# Patient Record
Sex: FEMALE | Race: WHITE | NOT HISPANIC OR LATINO | URBAN - METROPOLITAN AREA
[De-identification: names, ages, dates, MRNs, and addresses within clinical notes are randomized per-mention and may not be internally consistent; named-entity substitution may affect disease eponyms.]

---

## 2021-01-21 ENCOUNTER — OFFICE VISIT (OUTPATIENT)
Dept: URBAN - METROPOLITAN AREA CLINIC 70 | Facility: CLINIC | Age: 31
End: 2021-01-21
Payer: MEDICAID

## 2021-01-21 ENCOUNTER — WEB ENCOUNTER (OUTPATIENT)
Dept: URBAN - METROPOLITAN AREA CLINIC 70 | Facility: CLINIC | Age: 31
End: 2021-01-21

## 2021-01-21 ENCOUNTER — LAB OUTSIDE AN ENCOUNTER (OUTPATIENT)
Dept: URBAN - METROPOLITAN AREA CLINIC 70 | Facility: CLINIC | Age: 31
End: 2021-01-21

## 2021-01-21 VITALS
SYSTOLIC BLOOD PRESSURE: 123 MMHG | HEART RATE: 98 BPM | DIASTOLIC BLOOD PRESSURE: 78 MMHG | TEMPERATURE: 96 F | HEIGHT: 66 IN | BODY MASS INDEX: 39.79 KG/M2 | WEIGHT: 247.6 LBS

## 2021-01-21 DIAGNOSIS — K21.9 ACID REFLUX: ICD-10-CM

## 2021-01-21 DIAGNOSIS — K59.09 CHRONIC CONSTIPATION: ICD-10-CM

## 2021-01-21 DIAGNOSIS — R10.84 GENERALIZED ABDOMINAL PAIN: ICD-10-CM

## 2021-01-21 PROCEDURE — G8427 DOCREV CUR MEDS BY ELIG CLIN: HCPCS | Performed by: REGISTERED NURSE

## 2021-01-21 PROCEDURE — G9902 PT SCRN TBCO AND ID AS USER: HCPCS | Performed by: REGISTERED NURSE

## 2021-01-21 PROCEDURE — G8483 FLU IMM NO ADMIN DOC REA: HCPCS | Performed by: REGISTERED NURSE

## 2021-01-21 PROCEDURE — 99204 OFFICE O/P NEW MOD 45 MIN: CPT | Performed by: REGISTERED NURSE

## 2021-01-21 PROCEDURE — G8417 CALC BMI ABV UP PARAM F/U: HCPCS | Performed by: REGISTERED NURSE

## 2021-01-21 PROCEDURE — G9906 PT RECV TBCO CESS INTERV: HCPCS | Performed by: REGISTERED NURSE

## 2021-01-21 PROCEDURE — 4004F PT TOBACCO SCREEN RCVD TLK: CPT | Performed by: REGISTERED NURSE

## 2021-01-21 RX ORDER — PANTOPRAZOLE SODIUM 40 MG/1
1 TABLET TABLET, DELAYED RELEASE ORAL ONCE A DAY
Qty: 90 | Refills: 3 | OUTPATIENT
Start: 2021-01-21

## 2021-01-21 NOTE — HPI-TODAY'S VISIT:
Pt presents with c/o GERD. Symptoms include daily episodes of heartburn, indigestion, and intermittent chest pain. Symptoms have been present for several years. Past treatment has been over the counter antacids with minimal improvement. Symptoms are triggered by eating or drinking anything. Symptoms are relieved by nothing in particular. No prior workup has been done. She also c/o constipation. Bowels move every 2-3 days. She reports bloating and gas pain. She has been having some generalized abdominal pain for the past 1 month. No particular aggravating or alleviating factors. Pain is worse in the LUQ and LLQ.

## 2021-01-21 NOTE — PHYSICAL EXAM CARDIOVASCULAR:
no edema, no murmurs, regular rate and rhythm no loss of consciousness, no gait abnormality, no headache, no sensory deficits, and no weakness.

## 2021-02-11 ENCOUNTER — OFFICE VISIT (OUTPATIENT)
Dept: URBAN - METROPOLITAN AREA CLINIC 70 | Facility: CLINIC | Age: 31
End: 2021-02-11
Payer: MEDICAID

## 2021-02-11 DIAGNOSIS — K59.09 CHRONIC CONSTIPATION: ICD-10-CM

## 2021-02-11 DIAGNOSIS — K21.9 GERD: ICD-10-CM

## 2021-02-11 PROCEDURE — G9902 PT SCRN TBCO AND ID AS USER: HCPCS | Performed by: REGISTERED NURSE

## 2021-02-11 PROCEDURE — G8417 CALC BMI ABV UP PARAM F/U: HCPCS | Performed by: REGISTERED NURSE

## 2021-02-11 PROCEDURE — G8483 FLU IMM NO ADMIN DOC REA: HCPCS | Performed by: REGISTERED NURSE

## 2021-02-11 PROCEDURE — G9906 PT RECV TBCO CESS INTERV: HCPCS | Performed by: REGISTERED NURSE

## 2021-02-11 PROCEDURE — G8427 DOCREV CUR MEDS BY ELIG CLIN: HCPCS | Performed by: REGISTERED NURSE

## 2021-02-11 PROCEDURE — 99213 OFFICE O/P EST LOW 20 MIN: CPT | Performed by: REGISTERED NURSE

## 2021-02-11 PROCEDURE — 4004F PT TOBACCO SCREEN RCVD TLK: CPT | Performed by: REGISTERED NURSE

## 2021-02-11 RX ORDER — PANTOPRAZOLE SODIUM 40 MG/1
1 TABLET TABLET, DELAYED RELEASE ORAL ONCE A DAY
Qty: 90 | Refills: 3 | Status: ACTIVE | COMMUNITY
Start: 2021-01-21

## 2021-02-11 RX ORDER — PANTOPRAZOLE SODIUM 40 MG/1
1 TABLET TABLET, DELAYED RELEASE ORAL ONCE A DAY
OUTPATIENT
Start: 2021-01-21

## 2021-02-11 NOTE — HPI-TODAY'S VISIT:
Note from OV 1/21/21: Pt presents with c/o GERD. Symptoms include daily episodes of heartburn, indigestion, and intermittent chest pain. Symptoms have been present for several years. Past treatment has been over the counter antacids with minimal improvement. Symptoms are triggered by eating or drinking anything. Symptoms are relieved by nothing in particular. No prior workup has been done. She also c/o constipation. Bowels move every 2-3 days. She reports bloating and gas pain. She has been having some generalized abdominal pain for the past 1 month. No particular aggravating or alleviating factors. Pain is worse in the LUQ and LLQ. TODAY: She was prescribed Protonix and Miralax at last visit. She has not been taking the Miralax every day but bowels are moving a little better. Heartburn and abdominal pain have completely resolved.

## 2021-08-12 ENCOUNTER — OFFICE VISIT (OUTPATIENT)
Dept: URBAN - METROPOLITAN AREA CLINIC 70 | Facility: CLINIC | Age: 31
End: 2021-08-12

## 2021-08-12 NOTE — HPI-TODAY'S VISIT:
Note from OV 1/21/21: Pt presents with c/o GERD. Symptoms include daily episodes of heartburn, indigestion, and intermittent chest pain. Symptoms have been present for several years. Past treatment has been over the counter antacids with minimal improvement. Symptoms are triggered by eating or drinking anything. Symptoms are relieved by nothing in particular. No prior workup has been done. She also c/o constipation. Bowels move every 2-3 days. She reports bloating and gas pain. She has been having some generalized abdominal pain for the past 1 month. No particular aggravating or alleviating factors. Pain is worse in the LUQ and LLQ. ------------------------------ Note from OV 2/11/21: She was prescribed Protonix and Miralax at last visit. She has not been taking the Miralax every day but bowels are moving a little better. Heartburn and abdominal pain have completely resolved. ------------------------------- TODAY:

## 2022-08-04 ENCOUNTER — OFFICE VISIT (OUTPATIENT)
Dept: URBAN - METROPOLITAN AREA CLINIC 70 | Facility: CLINIC | Age: 32
End: 2022-08-04

## 2022-08-04 RX ORDER — PANTOPRAZOLE SODIUM 40 MG/1
1 TABLET TABLET, DELAYED RELEASE ORAL ONCE A DAY
Status: ACTIVE | COMMUNITY
Start: 2021-01-21

## 2022-12-20 ENCOUNTER — LAB OUTSIDE AN ENCOUNTER (OUTPATIENT)
Dept: URBAN - METROPOLITAN AREA CLINIC 70 | Facility: CLINIC | Age: 32
End: 2022-12-20

## 2022-12-20 ENCOUNTER — OFFICE VISIT (OUTPATIENT)
Dept: URBAN - METROPOLITAN AREA CLINIC 70 | Facility: CLINIC | Age: 32
End: 2022-12-20
Payer: MEDICAID

## 2022-12-20 VITALS
HEIGHT: 66 IN | HEART RATE: 68 BPM | BODY MASS INDEX: 37.12 KG/M2 | DIASTOLIC BLOOD PRESSURE: 63 MMHG | SYSTOLIC BLOOD PRESSURE: 112 MMHG | WEIGHT: 231 LBS | TEMPERATURE: 97.6 F

## 2022-12-20 DIAGNOSIS — K21.9 GERD: ICD-10-CM

## 2022-12-20 DIAGNOSIS — R10.13 EPIGASTRIC PAIN: ICD-10-CM

## 2022-12-20 DIAGNOSIS — K85.90 ACUTE PANCREATITIS WITHOUT INFECTION OR NECROSIS, UNSPECIFIED PANCREATITIS TYPE: ICD-10-CM

## 2022-12-20 PROCEDURE — 99204 OFFICE O/P NEW MOD 45 MIN: CPT | Performed by: INTERNAL MEDICINE

## 2022-12-20 RX ORDER — IBUPROFEN 400 MG/1
1 TABLET WITH FOOD OR MILK AS NEEDED TABLET, FILM COATED ORAL THREE TIMES A DAY
Status: ACTIVE | COMMUNITY
Start: 2022-12-20

## 2022-12-20 RX ORDER — PANTOPRAZOLE SODIUM 40 MG/1
1 TABLET TABLET, DELAYED RELEASE ORAL ONCE A DAY
Qty: 30 | OUTPATIENT
Start: 2022-12-20

## 2022-12-20 RX ORDER — PANTOPRAZOLE SODIUM 40 MG/1
1 TABLET TABLET, DELAYED RELEASE ORAL ONCE A DAY
Status: ACTIVE | COMMUNITY
Start: 2021-01-21

## 2022-12-20 NOTE — HPI-TODAY'S VISIT:
32 year old feamle here for HD follow u. Presented to the Ed with complaint of acute epigastric pain, raditing to back. Ct scan did not reveal acute abdominal pathology. Found to have elevated lipase at 87. Pt reports that she continues to experience dull badominal pain chronically but the day of her ED admission her pain was severe nad rdaiting to back. Denies heavy alcohol consumption. Continues to have dull abdominal pain. Reports having heart burn as well. Ros otherwise unremarkable. family hx reviewed.

## 2023-01-16 ENCOUNTER — TELEPHONE ENCOUNTER (OUTPATIENT)
Dept: URBAN - METROPOLITAN AREA CLINIC 70 | Facility: CLINIC | Age: 33
End: 2023-01-16

## 2023-01-17 LAB
CHOL/HDLC RATIO: 4.2
CHOLESTEROL, TOTAL: 146
HDL CHOLESTEROL: 35
IGG, SUBCLASS 4: 114.9
LDL CHOLESTEROL CALC: 92
NON HDL CHOLESTEROL: 111
TRIGLYCERIDES: 98

## 2023-01-19 ENCOUNTER — CLAIMS CREATED FROM THE CLAIM WINDOW (OUTPATIENT)
Dept: URBAN - METROPOLITAN AREA SURGERY CENTER 24 | Facility: SURGERY CENTER | Age: 33
End: 2023-01-19
Payer: MEDICAID

## 2023-01-19 ENCOUNTER — CLAIMS CREATED FROM THE CLAIM WINDOW (OUTPATIENT)
Dept: URBAN - METROPOLITAN AREA CLINIC 4 | Facility: CLINIC | Age: 33
End: 2023-01-19
Payer: MEDICAID

## 2023-01-19 ENCOUNTER — OFFICE VISIT (OUTPATIENT)
Dept: URBAN - METROPOLITAN AREA SURGERY CENTER 24 | Facility: SURGERY CENTER | Age: 33
End: 2023-01-19

## 2023-01-19 ENCOUNTER — TELEPHONE ENCOUNTER (OUTPATIENT)
Dept: URBAN - METROPOLITAN AREA CLINIC 70 | Facility: CLINIC | Age: 33
End: 2023-01-19

## 2023-01-19 DIAGNOSIS — K25.7 CHRONIC GASTRIC ULCER WITHOUT HEMORRHAGE OR PERFORATION: ICD-10-CM

## 2023-01-19 DIAGNOSIS — K29.60 ADENOPAPILLOMATOSIS GASTRICA: ICD-10-CM

## 2023-01-19 DIAGNOSIS — K31.89 OTHER DISEASES OF STOMACH AND DUODENUM: ICD-10-CM

## 2023-01-19 DIAGNOSIS — K21.9 GERD: ICD-10-CM

## 2023-01-19 DIAGNOSIS — K22.89 DILATATION OF ESOPHAGUS: ICD-10-CM

## 2023-01-19 DIAGNOSIS — K31.A0 GASTRIC INTESTINAL METAPLASIA, UNSPECIFIED: ICD-10-CM

## 2023-01-19 PROBLEM — 235595009 GASTROESOPHAGEAL REFLUX DISEASE: Status: ACTIVE | Noted: 2021-02-11

## 2023-01-19 PROCEDURE — 88341 IMHCHEM/IMCYTCHM EA ADD ANTB: CPT | Performed by: PATHOLOGY

## 2023-01-19 PROCEDURE — 43239 EGD BIOPSY SINGLE/MULTIPLE: CPT | Performed by: INTERNAL MEDICINE

## 2023-01-19 PROCEDURE — 88342 IMHCHEM/IMCYTCHM 1ST ANTB: CPT | Performed by: PATHOLOGY

## 2023-01-19 PROCEDURE — 88305 TISSUE EXAM BY PATHOLOGIST: CPT | Performed by: PATHOLOGY

## 2023-01-19 PROCEDURE — G8907 PT DOC NO EVENTS ON DISCHARG: HCPCS | Performed by: INTERNAL MEDICINE

## 2023-01-19 RX ORDER — PANTOPRAZOLE SODIUM 40 MG/1
1 TABLET TABLET, DELAYED RELEASE ORAL ONCE A DAY
Status: ACTIVE | COMMUNITY
Start: 2021-01-21

## 2023-01-19 RX ORDER — PANTOPRAZOLE SODIUM 40 MG/1
1 TABLET TABLET, DELAYED RELEASE ORAL TWICE A DAY
Qty: 60 | Refills: 2 | OUTPATIENT
Start: 2023-01-19

## 2023-01-19 RX ORDER — IBUPROFEN 400 MG/1
1 TABLET WITH FOOD OR MILK AS NEEDED TABLET, FILM COATED ORAL THREE TIMES A DAY
Status: ACTIVE | COMMUNITY
Start: 2022-12-20

## 2023-01-19 RX ORDER — PANTOPRAZOLE SODIUM 40 MG/1
1 TABLET TABLET, DELAYED RELEASE ORAL ONCE A DAY
OUTPATIENT
Start: 2021-01-21

## 2023-01-19 RX ORDER — PANTOPRAZOLE SODIUM 40 MG/1
1 TABLET TABLET, DELAYED RELEASE ORAL ONCE A DAY
Qty: 30 | Status: ACTIVE | COMMUNITY
Start: 2022-12-20

## 2023-02-12 ENCOUNTER — TELEPHONE ENCOUNTER (OUTPATIENT)
Dept: URBAN - METROPOLITAN AREA CLINIC 70 | Facility: CLINIC | Age: 33
End: 2023-02-12

## 2023-02-17 ENCOUNTER — LAB OUTSIDE AN ENCOUNTER (OUTPATIENT)
Dept: URBAN - METROPOLITAN AREA CLINIC 70 | Facility: CLINIC | Age: 33
End: 2023-02-17

## 2023-02-17 ENCOUNTER — OFFICE VISIT (OUTPATIENT)
Dept: URBAN - METROPOLITAN AREA CLINIC 70 | Facility: CLINIC | Age: 33
End: 2023-02-17
Payer: MEDICAID

## 2023-02-17 VITALS
WEIGHT: 233.6 LBS | HEART RATE: 80 BPM | BODY MASS INDEX: 37.54 KG/M2 | HEIGHT: 66 IN | DIASTOLIC BLOOD PRESSURE: 87 MMHG | SYSTOLIC BLOOD PRESSURE: 126 MMHG | TEMPERATURE: 98.6 F

## 2023-02-17 DIAGNOSIS — R10.13 EPIGASTRIC PAIN: ICD-10-CM

## 2023-02-17 DIAGNOSIS — K21.00 ALKALINE REFLUX ESOPHAGITIS: ICD-10-CM

## 2023-02-17 DIAGNOSIS — K85.90 ACUTE PANCREATITIS WITHOUT INFECTION OR NECROSIS, UNSPECIFIED PANCREATITIS TYPE: ICD-10-CM

## 2023-02-17 DIAGNOSIS — R14.0 BLOATING: ICD-10-CM

## 2023-02-17 PROBLEM — 266433003 GASTROESOPHAGEAL REFLUX DISEASE WITH ESOPHAGITIS: Status: ACTIVE | Noted: 2023-02-17

## 2023-02-17 PROBLEM — 197456007: Status: ACTIVE | Noted: 2022-12-20

## 2023-02-17 PROBLEM — 116289008: Status: ACTIVE | Noted: 2023-02-17

## 2023-02-17 PROCEDURE — 99214 OFFICE O/P EST MOD 30 MIN: CPT | Performed by: INTERNAL MEDICINE

## 2023-02-17 RX ORDER — PANTOPRAZOLE SODIUM 40 MG/1
1 TABLET TABLET, DELAYED RELEASE ORAL ONCE A DAY
Qty: 30 | Status: ACTIVE | COMMUNITY
Start: 2022-12-20

## 2023-02-17 RX ORDER — IBUPROFEN 400 MG/1
1 TABLET WITH FOOD OR MILK AS NEEDED TABLET, FILM COATED ORAL THREE TIMES A DAY
Status: ACTIVE | COMMUNITY
Start: 2022-12-20

## 2023-02-17 NOTE — HPI-TODAY'S VISIT:
32 year old female here for follow up.  Patient was previously seen with complaint of acute pancreatitis. She was noted to have increased levels of IgG subclass 4, ?AIP (although levels not very impressive). Ct scan at time of pancreatitis noted negative for gall stones. She underwent an EGD that showed gastric ulcer. Today she complaints of intermittent epigastric pain, bloating, belching, intermittent heart burn. Her son recently diagnosed with H.pylori, and she is worried about having H.pylori as well. ROS otherwise unremarkable.   Endoscopic data: EGD: [place] colored mucosa (negative for IM), non-bleeding gastric ulcer (stomach biopsy with chronic inactive gastritis, ?treated H.pylori), normal duodenum.

## 2023-02-20 ENCOUNTER — LAB OUTSIDE AN ENCOUNTER (OUTPATIENT)
Dept: URBAN - METROPOLITAN AREA CLINIC 70 | Facility: CLINIC | Age: 33
End: 2023-02-20

## 2023-02-21 LAB
H PYLORI BREATH TEST: DETECTED
H. PYLORI BREATH TEST: DETECTED
INTERPRETATION: DETECTED

## 2023-02-27 ENCOUNTER — TELEPHONE ENCOUNTER (OUTPATIENT)
Dept: URBAN - METROPOLITAN AREA CLINIC 70 | Facility: CLINIC | Age: 33
End: 2023-02-27

## 2023-02-27 RX ORDER — CLARITHROMYCIN 500 MG/1
1 TABLET TABLET, FILM COATED ORAL
Qty: 28 TABLET | Refills: 0 | OUTPATIENT
Start: 2023-02-27 | End: 2023-03-13

## 2023-02-27 RX ORDER — AMOXICILLIN 500 MG/1
2 CAPSULES CAPSULE ORAL TWICE A DAY
Qty: 56 CAPSULE | Refills: 0 | OUTPATIENT
Start: 2023-02-27 | End: 2023-03-13

## 2023-02-27 RX ORDER — PANTOPRAZOLE SODIUM 40 MG/1
1 TABLET TABLET, DELAYED RELEASE ORAL ONCE A DAY
Qty: 30 | Status: ACTIVE | COMMUNITY
Start: 2022-12-20

## 2023-02-27 RX ORDER — IBUPROFEN 400 MG/1
1 TABLET WITH FOOD OR MILK AS NEEDED TABLET, FILM COATED ORAL THREE TIMES A DAY
Status: ACTIVE | COMMUNITY
Start: 2022-12-20

## 2023-02-27 RX ORDER — OMEPRAZOLE 40 MG/1
1 CAPSULE 30 MINUTES BEFORE MORNING MEAL AND 30 MINUTES BEFORE SUPPER CAPSULE, DELAYED RELEASE ORAL TWICE A DAY
Qty: 28 | Refills: 0 | OUTPATIENT
Start: 2023-02-27

## 2023-03-07 ENCOUNTER — TELEPHONE ENCOUNTER (OUTPATIENT)
Dept: URBAN - METROPOLITAN AREA CLINIC 70 | Facility: CLINIC | Age: 33
End: 2023-03-07

## 2023-03-07 PROBLEM — 721730009: Status: ACTIVE | Noted: 2023-02-27

## 2023-03-31 ENCOUNTER — OFFICE VISIT (OUTPATIENT)
Dept: URBAN - METROPOLITAN AREA CLINIC 70 | Facility: CLINIC | Age: 33
End: 2023-03-31
Payer: MEDICAID

## 2023-03-31 ENCOUNTER — LAB OUTSIDE AN ENCOUNTER (OUTPATIENT)
Dept: URBAN - METROPOLITAN AREA CLINIC 70 | Facility: CLINIC | Age: 33
End: 2023-03-31

## 2023-03-31 VITALS
BODY MASS INDEX: 37.45 KG/M2 | DIASTOLIC BLOOD PRESSURE: 86 MMHG | HEART RATE: 92 BPM | SYSTOLIC BLOOD PRESSURE: 133 MMHG | HEIGHT: 66 IN | WEIGHT: 233 LBS

## 2023-03-31 DIAGNOSIS — K21.00 ALKALINE REFLUX ESOPHAGITIS: ICD-10-CM

## 2023-03-31 DIAGNOSIS — A04.8 H. PYLORI INFECTION: ICD-10-CM

## 2023-03-31 DIAGNOSIS — K25.9 GASTRIC ULCER WITHOUT HEMORRHAGE OR PERFORATION, UNSPECIFIED CHRONICITY: ICD-10-CM

## 2023-03-31 DIAGNOSIS — K59.00 CONSTIPATION, UNSPECIFIED CONSTIPATION TYPE: ICD-10-CM

## 2023-03-31 PROBLEM — 73481001: Status: ACTIVE | Noted: 2023-03-31

## 2023-03-31 PROBLEM — 14760008: Status: ACTIVE | Noted: 2023-03-31

## 2023-03-31 PROCEDURE — 99214 OFFICE O/P EST MOD 30 MIN: CPT | Performed by: INTERNAL MEDICINE

## 2023-03-31 RX ORDER — IBUPROFEN 400 MG/1
1 TABLET WITH FOOD OR MILK AS NEEDED TABLET, FILM COATED ORAL THREE TIMES A DAY
Status: ACTIVE | COMMUNITY
Start: 2022-12-20

## 2023-03-31 RX ORDER — OMEPRAZOLE 40 MG/1
1 CAPSULE 30 MINUTES BEFORE MORNING MEAL CAPSULE, DELAYED RELEASE ORAL ONCE A DAY
Qty: 30 | OUTPATIENT
Start: 2023-03-31

## 2023-03-31 RX ORDER — PANTOPRAZOLE SODIUM 40 MG/1
1 TABLET TABLET, DELAYED RELEASE ORAL ONCE A DAY
Qty: 30 | Status: ACTIVE | COMMUNITY
Start: 2022-12-20

## 2023-03-31 RX ORDER — OMEPRAZOLE 40 MG/1
1 CAPSULE 30 MINUTES BEFORE MORNING MEAL AND 30 MINUTES BEFORE SUPPER CAPSULE, DELAYED RELEASE ORAL TWICE A DAY
Qty: 28 | Refills: 0 | Status: ACTIVE | COMMUNITY
Start: 2023-02-27

## 2023-03-31 NOTE — HPI-TODAY'S VISIT:
32 year old female here for follow up.  Patient was previously seen with complaint of acute pancreatitis. She was noted to have increased levels of IgG subclass 4, ?AIP (although levels not very impressive). Ct scan at time of pancreatitis noted negative for gall stones. She underwent an EGD that showed gastric ulcer and later noted to have positive H.pylori on breath test. Her son has also recently treated for H.pylori. She is s/p treatment for H.pylori. Reports that abdominal pain has improved but not completely resolved. Reports having constipation. ROS otherwise unremarkable.   Endoscopic data: EGD: [place] colored mucosa (negative for IM), non-bleeding gastric ulcer (stomach biopsy with chronic inactive gastritis, ?treated H.pylori), normal duodenum.

## 2023-04-04 ENCOUNTER — TELEPHONE ENCOUNTER (OUTPATIENT)
Dept: URBAN - METROPOLITAN AREA CLINIC 70 | Facility: CLINIC | Age: 33
End: 2023-04-04

## 2023-04-04 RX ORDER — OMEPRAZOLE 40 MG/1
1 CAPSULE 30 MINUTES BEFORE MORNING MEAL CAPSULE, DELAYED RELEASE ORAL ONCE A DAY
Qty: 30
Start: 2023-03-31

## 2023-04-13 ENCOUNTER — DASHBOARD ENCOUNTERS (OUTPATIENT)
Age: 33
End: 2023-04-13

## 2023-04-17 ENCOUNTER — OFFICE VISIT (OUTPATIENT)
Dept: URBAN - METROPOLITAN AREA CLINIC 70 | Facility: CLINIC | Age: 33
End: 2023-04-17

## 2023-04-17 VITALS — HEIGHT: 66 IN

## 2023-04-17 RX ORDER — PANTOPRAZOLE SODIUM 40 MG/1
1 TABLET TABLET, DELAYED RELEASE ORAL ONCE A DAY
Qty: 30 | Status: ACTIVE | COMMUNITY
Start: 2022-12-20

## 2023-04-17 RX ORDER — OMEPRAZOLE 40 MG/1
1 CAPSULE 30 MINUTES BEFORE MORNING MEAL AND 30 MINUTES BEFORE SUPPER CAPSULE, DELAYED RELEASE ORAL TWICE A DAY
Qty: 28 | Refills: 0 | Status: ACTIVE | COMMUNITY
Start: 2023-02-27

## 2023-04-17 RX ORDER — OMEPRAZOLE 40 MG/1
1 CAPSULE 30 MINUTES BEFORE MORNING MEAL CAPSULE, DELAYED RELEASE ORAL ONCE A DAY
Qty: 30 | Status: ACTIVE | COMMUNITY
Start: 2023-03-31

## 2023-04-17 RX ORDER — IBUPROFEN 400 MG/1
1 TABLET WITH FOOD OR MILK AS NEEDED TABLET, FILM COATED ORAL THREE TIMES A DAY
Status: ACTIVE | COMMUNITY
Start: 2022-12-20

## 2023-05-02 ENCOUNTER — OFFICE VISIT (OUTPATIENT)
Dept: URBAN - METROPOLITAN AREA MEDICAL CENTER 42 | Facility: MEDICAL CENTER | Age: 33
End: 2023-05-02